# Patient Record
Sex: MALE | Race: WHITE | NOT HISPANIC OR LATINO | URBAN - METROPOLITAN AREA
[De-identification: names, ages, dates, MRNs, and addresses within clinical notes are randomized per-mention and may not be internally consistent; named-entity substitution may affect disease eponyms.]

---

## 2019-11-07 ENCOUNTER — OFFICE (OUTPATIENT)
Dept: URBAN - METROPOLITAN AREA CLINIC 33 | Facility: CLINIC | Age: 39
End: 2019-11-07

## 2019-11-07 VITALS
DIASTOLIC BLOOD PRESSURE: 91 MMHG | SYSTOLIC BLOOD PRESSURE: 121 MMHG | TEMPERATURE: 97 F | HEIGHT: 71 IN | HEART RATE: 81 BPM | WEIGHT: 216.4 LBS

## 2019-11-07 DIAGNOSIS — R13.10 DYSPHAGIA, UNSPECIFIED: ICD-10-CM

## 2019-11-07 PROCEDURE — 99204 OFFICE O/P NEW MOD 45 MIN: CPT

## 2019-11-07 NOTE — SERVICEHPINOTES
LAWANDA SORTO   is a   39   male who presents with dysphagia. It has been about a year that he has been experiencing trouble swallowing with solids. It gets stuck at upper esophagus, has to drink water to bring it down. Three times with meat, had to bring it up. One times called 911 and eventually vomited up. BRDenies nausea, vomiting, dyspepsia or weight loss. BROccasional acid reflux.BRBowel movements are regular. Denies blood in stool, melena, constipation, diarrhea or abdominal pain.  BROccasional loose bowel after drinking alcoholic beverages. BRDenies chest pain, palpitations or sob. BRDenies family history of colon cancer.

## 2019-11-13 ENCOUNTER — OFFICE (OUTPATIENT)
Dept: URBAN - METROPOLITAN AREA CLINIC 32 | Facility: CLINIC | Age: 39
End: 2019-11-13

## 2019-11-13 VITALS
DIASTOLIC BLOOD PRESSURE: 105 MMHG | DIASTOLIC BLOOD PRESSURE: 79 MMHG | RESPIRATION RATE: 16 BRPM | TEMPERATURE: 98.1 F | HEART RATE: 65 BPM | HEART RATE: 85 BPM | DIASTOLIC BLOOD PRESSURE: 75 MMHG | HEIGHT: 71 IN | DIASTOLIC BLOOD PRESSURE: 66 MMHG | SYSTOLIC BLOOD PRESSURE: 104 MMHG | WEIGHT: 216 LBS | SYSTOLIC BLOOD PRESSURE: 99 MMHG | DIASTOLIC BLOOD PRESSURE: 69 MMHG | SYSTOLIC BLOOD PRESSURE: 122 MMHG | DIASTOLIC BLOOD PRESSURE: 72 MMHG | HEART RATE: 130 BPM | RESPIRATION RATE: 20 BRPM | DIASTOLIC BLOOD PRESSURE: 80 MMHG | RESPIRATION RATE: 18 BRPM | OXYGEN SATURATION: 98 % | SYSTOLIC BLOOD PRESSURE: 129 MMHG | OXYGEN SATURATION: 96 % | OXYGEN SATURATION: 99 % | RESPIRATION RATE: 10 BRPM | OXYGEN SATURATION: 100 % | SYSTOLIC BLOOD PRESSURE: 116 MMHG | SYSTOLIC BLOOD PRESSURE: 103 MMHG | HEART RATE: 82 BPM | HEART RATE: 76 BPM | HEART RATE: 89 BPM | SYSTOLIC BLOOD PRESSURE: 108 MMHG | OXYGEN SATURATION: 97 %

## 2019-11-13 DIAGNOSIS — K20.8 OTHER ESOPHAGITIS: ICD-10-CM

## 2019-11-13 DIAGNOSIS — R13.10 DYSPHAGIA, UNSPECIFIED: ICD-10-CM

## 2019-11-13 DIAGNOSIS — K44.9 DIAPHRAGMATIC HERNIA WITHOUT OBSTRUCTION OR GANGRENE: ICD-10-CM

## 2019-11-13 PROBLEM — K20.9 ESOPHAGITIS, UNSPECIFIED: Status: ACTIVE | Noted: 2019-11-13

## 2019-11-13 PROCEDURE — 43235 EGD DIAGNOSTIC BRUSH WASH: CPT

## 2019-11-13 RX ORDER — DEXLANSOPRAZOLE 60 MG/1
60 CAPSULE, DELAYED RELEASE ORAL
Qty: 30 | Refills: 5 | Status: ACTIVE
Start: 2019-11-13